# Patient Record
Sex: FEMALE | Race: BLACK OR AFRICAN AMERICAN | Employment: UNEMPLOYED | URBAN - METROPOLITAN AREA
[De-identification: names, ages, dates, MRNs, and addresses within clinical notes are randomized per-mention and may not be internally consistent; named-entity substitution may affect disease eponyms.]

---

## 2020-07-10 ENCOUNTER — HOSPITAL ENCOUNTER (INPATIENT)
Age: 25
LOS: 4 days | Discharge: HOME | DRG: 881 | End: 2020-07-14
Attending: EMERGENCY MEDICINE | Admitting: PSYCHIATRY & NEUROLOGY
Payer: COMMERCIAL

## 2020-07-10 DIAGNOSIS — R45.851 SUICIDAL IDEATION: ICD-10-CM

## 2020-07-10 DIAGNOSIS — F32.A DEPRESSION, UNSPECIFIED DEPRESSION TYPE: Primary | ICD-10-CM

## 2020-07-10 PROBLEM — F32.9 MDD (MAJOR DEPRESSIVE DISORDER): Status: ACTIVE | Noted: 2020-07-10

## 2020-07-10 LAB
ALBUMIN SERPL-MCNC: 3.7 G/DL (ref 3.5–5)
ALBUMIN/GLOB SERPL: 0.9 {RATIO} (ref 1.1–2.2)
ALP SERPL-CCNC: 52 U/L (ref 45–117)
ALT SERPL-CCNC: 24 U/L (ref 12–78)
AMPHET UR QL SCN: NEGATIVE
ANION GAP SERPL CALC-SCNC: 10 MMOL/L (ref 5–15)
APAP SERPL-MCNC: <2 UG/ML (ref 10–30)
APPEARANCE UR: CLEAR
AST SERPL-CCNC: 13 U/L (ref 15–37)
BARBITURATES UR QL SCN: NEGATIVE
BASOPHILS # BLD: 0.1 K/UL (ref 0–0.1)
BASOPHILS NFR BLD: 1 % (ref 0–1)
BENZODIAZ UR QL: NEGATIVE
BILIRUB SERPL-MCNC: 0.3 MG/DL (ref 0.2–1)
BILIRUB UR QL: NEGATIVE
BUN SERPL-MCNC: 12 MG/DL (ref 6–20)
BUN/CREAT SERPL: 13 (ref 12–20)
CALCIUM SERPL-MCNC: 8.7 MG/DL (ref 8.5–10.1)
CANNABINOIDS UR QL SCN: NEGATIVE
CHLORIDE SERPL-SCNC: 108 MMOL/L (ref 97–108)
CO2 SERPL-SCNC: 25 MMOL/L (ref 21–32)
COCAINE UR QL SCN: NEGATIVE
COLOR UR: ABNORMAL
CREAT SERPL-MCNC: 0.89 MG/DL (ref 0.55–1.02)
DIFFERENTIAL METHOD BLD: ABNORMAL
DRUG SCRN COMMENT,DRGCM: NORMAL
EOSINOPHIL # BLD: 0.2 K/UL (ref 0–0.4)
EOSINOPHIL NFR BLD: 2 % (ref 0–7)
ERYTHROCYTE [DISTWIDTH] IN BLOOD BY AUTOMATED COUNT: 14.8 % (ref 11.5–14.5)
ETHANOL SERPL-MCNC: <10 MG/DL
GLOBULIN SER CALC-MCNC: 3.9 G/DL (ref 2–4)
GLUCOSE SERPL-MCNC: 117 MG/DL (ref 65–100)
GLUCOSE UR STRIP.AUTO-MCNC: NEGATIVE MG/DL
HCG UR QL: NEGATIVE
HCT VFR BLD AUTO: 33.1 % (ref 35–47)
HGB BLD-MCNC: 10.1 G/DL (ref 11.5–16)
HGB UR QL STRIP: NEGATIVE
IMM GRANULOCYTES # BLD AUTO: 0 K/UL (ref 0–0.04)
IMM GRANULOCYTES NFR BLD AUTO: 0 % (ref 0–0.5)
KETONES UR QL STRIP.AUTO: NEGATIVE MG/DL
LEUKOCYTE ESTERASE UR QL STRIP.AUTO: NEGATIVE
LYMPHOCYTES # BLD: 2.8 K/UL (ref 0.8–3.5)
LYMPHOCYTES NFR BLD: 37 % (ref 12–49)
MCH RBC QN AUTO: 24.9 PG (ref 26–34)
MCHC RBC AUTO-ENTMCNC: 30.5 G/DL (ref 30–36.5)
MCV RBC AUTO: 81.7 FL (ref 80–99)
METHADONE UR QL: NEGATIVE
MONOCYTES # BLD: 0.7 K/UL (ref 0–1)
MONOCYTES NFR BLD: 10 % (ref 5–13)
NEUTS SEG # BLD: 3.7 K/UL (ref 1.8–8)
NEUTS SEG NFR BLD: 50 % (ref 32–75)
NITRITE UR QL STRIP.AUTO: NEGATIVE
NRBC # BLD: 0 K/UL (ref 0–0.01)
NRBC BLD-RTO: 0 PER 100 WBC
OPIATES UR QL: NEGATIVE
PCP UR QL: NEGATIVE
PH UR STRIP: 6 [PH] (ref 5–8)
PLATELET # BLD AUTO: 285 K/UL (ref 150–400)
PMV BLD AUTO: 11.1 FL (ref 8.9–12.9)
POTASSIUM SERPL-SCNC: 3.6 MMOL/L (ref 3.5–5.1)
PROT SERPL-MCNC: 7.6 G/DL (ref 6.4–8.2)
PROT UR STRIP-MCNC: NEGATIVE MG/DL
RBC # BLD AUTO: 4.05 M/UL (ref 3.8–5.2)
SALICYLATES SERPL-MCNC: <1.7 MG/DL (ref 2.8–20)
SODIUM SERPL-SCNC: 143 MMOL/L (ref 136–145)
SP GR UR REFRACTOMETRY: >1.03 (ref 1–1.03)
UROBILINOGEN UR QL STRIP.AUTO: 0.2 EU/DL (ref 0.2–1)
WBC # BLD AUTO: 7.5 K/UL (ref 3.6–11)

## 2020-07-10 PROCEDURE — 65220000003 HC RM SEMIPRIVATE PSYCH

## 2020-07-10 PROCEDURE — 85025 COMPLETE CBC W/AUTO DIFF WBC: CPT

## 2020-07-10 PROCEDURE — 90791 PSYCH DIAGNOSTIC EVALUATION: CPT

## 2020-07-10 PROCEDURE — 80053 COMPREHEN METABOLIC PANEL: CPT

## 2020-07-10 PROCEDURE — 80307 DRUG TEST PRSMV CHEM ANLYZR: CPT

## 2020-07-10 PROCEDURE — 81003 URINALYSIS AUTO W/O SCOPE: CPT

## 2020-07-10 PROCEDURE — 99285 EMERGENCY DEPT VISIT HI MDM: CPT

## 2020-07-10 PROCEDURE — 36415 COLL VENOUS BLD VENIPUNCTURE: CPT

## 2020-07-10 PROCEDURE — 81025 URINE PREGNANCY TEST: CPT

## 2020-07-10 RX ORDER — VENLAFAXINE HYDROCHLORIDE 37.5 MG/1
37.5 CAPSULE, EXTENDED RELEASE ORAL DAILY
COMMUNITY
End: 2020-07-14

## 2020-07-10 NOTE — ED TRIAGE NOTES
Pt. Reports \"mental health problem\". Pt. States, \"I have been having suicidal ideation\", \"thoughts of harming myself\" for a couple of months. Pt. Reports just moving here, so has not been able to see anyone. Pt. States plan is to \"cut my jugular or slit my wrists\". Pt. Denies attempts. Pt. Denies AVH. Pt. States she \"sometimes\" wants to hurt someone else. \"My boyfriend\". Pt. Quit taking effexor x2 weeks.

## 2020-07-11 PROCEDURE — 65220000003 HC RM SEMIPRIVATE PSYCH

## 2020-07-11 PROCEDURE — 74011250637 HC RX REV CODE- 250/637: Performed by: NURSE PRACTITIONER

## 2020-07-11 RX ORDER — DIPHENHYDRAMINE HYDROCHLORIDE 50 MG/ML
50 INJECTION, SOLUTION INTRAMUSCULAR; INTRAVENOUS
Status: DISCONTINUED | OUTPATIENT
Start: 2020-07-11 | End: 2020-07-14 | Stop reason: HOSPADM

## 2020-07-11 RX ORDER — BENZTROPINE MESYLATE 1 MG/1
1 TABLET ORAL
Status: DISCONTINUED | OUTPATIENT
Start: 2020-07-11 | End: 2020-07-14 | Stop reason: HOSPADM

## 2020-07-11 RX ORDER — ACETAMINOPHEN 325 MG/1
650 TABLET ORAL
Status: DISCONTINUED | OUTPATIENT
Start: 2020-07-11 | End: 2020-07-14 | Stop reason: HOSPADM

## 2020-07-11 RX ORDER — TRAZODONE HYDROCHLORIDE 50 MG/1
50 TABLET ORAL
Status: DISCONTINUED | OUTPATIENT
Start: 2020-07-11 | End: 2020-07-14 | Stop reason: HOSPADM

## 2020-07-11 RX ORDER — OLANZAPINE 5 MG/1
5 TABLET ORAL
Status: DISCONTINUED | OUTPATIENT
Start: 2020-07-11 | End: 2020-07-14 | Stop reason: HOSPADM

## 2020-07-11 RX ORDER — SERTRALINE HYDROCHLORIDE 50 MG/1
25 TABLET, FILM COATED ORAL DAILY
Status: DISCONTINUED | OUTPATIENT
Start: 2020-07-11 | End: 2020-07-13

## 2020-07-11 RX ORDER — HALOPERIDOL 5 MG/ML
5 INJECTION INTRAMUSCULAR
Status: DISCONTINUED | OUTPATIENT
Start: 2020-07-11 | End: 2020-07-14 | Stop reason: HOSPADM

## 2020-07-11 RX ORDER — HYDROXYZINE 25 MG/1
50 TABLET, FILM COATED ORAL
Status: DISCONTINUED | OUTPATIENT
Start: 2020-07-11 | End: 2020-07-14 | Stop reason: HOSPADM

## 2020-07-11 RX ORDER — LORAZEPAM 2 MG/ML
1 INJECTION INTRAMUSCULAR
Status: DISCONTINUED | OUTPATIENT
Start: 2020-07-11 | End: 2020-07-14 | Stop reason: HOSPADM

## 2020-07-11 RX ORDER — ADHESIVE BANDAGE
30 BANDAGE TOPICAL DAILY PRN
Status: DISCONTINUED | OUTPATIENT
Start: 2020-07-11 | End: 2020-07-14 | Stop reason: HOSPADM

## 2020-07-11 RX ADMIN — SERTRALINE HYDROCHLORIDE 25 MG: 50 TABLET ORAL at 12:50

## 2020-07-11 NOTE — BH NOTES
Admission Note:  Patient is a 31year old  female admitted to the unit voluntarily. Patient arrived to the unit in wheelchair escorted by security. Patient was cooperative with the admission process and body search. No contraband was found during the body search. Patient signed a copy of the unit rules and expectations. Background:  Patient was brought into the emergency department by their boyfriend after having suicidal thoughts with a plan. Patient also expressed homicidal thoughts towards their boyfriend due to recent relationship struggles. Patient stated \"Ever since I moved to [place] 7 months ago I have been feeling so lonely and with coronavirus I have not been able to see my friends. \" Patient has not taken their effexor in over two weeks and stated \"I was bad before I even stopped taking my medication. \"    Mental Health history:  Patient reported seeing a psychiatrist through outpatient and being prescribed Effexor 37.5mg PO daily by Jerel Delatorre. Patient reports no previous inpatient admissions. Medical History:  Patient denies. Social:  Patient finished their masters program at [address] [place] [address] and recently moved to [place] 7 months ago for a new job. Patient has no support system in [place] and a long distance boyfriend. UDS:   Negative  Alcohol:  Negative.

## 2020-07-11 NOTE — BH NOTES
Hourly rounds completed. There are no noted behavioral issues to note. Resident has confirmed that yesterday she felt anxious and depressed. She does not feel that way today per her report. She has been both medication and meal compliant. Will continue to monitor for changes.

## 2020-07-11 NOTE — BSMART NOTE
Comprehensive Assessment Form Part 1 Section I - Disposition Axis I - Major Depressive Disorder Axis II - None Axis III - None Axis IV - Relationship stressors [place] V - 35 The Medical Doctor to Psychiatrist conference was not completed. The Medical Doctor is in agreement with Psychiatrist disposition because of (reason) patient is willing to be admitted voluntarily. The plan is admit to [facility]. The on-call Psychiatrist consulted was GONZALO Foster. The admitting Psychiatrist will be Dr. Emely Day. The admitting Diagnosis is Major Depression. The Payor source is [place]. Section II - Integrated Summary Summary:  Patient is a 31year old female seen face to face in the ER. She reported she has been experiencing suicidal ideation for 2 months that has been much worse recently. She reported her plan to kill herself would be to slit her wrists or throat, or to stab herself repeatedly. She also reported some homicidal thoughts towards her long distance boyfriend that she doesn't think are rational.  He is aware of the thoughts and she has no plan to harm him. She denied symptoms of psychosis. She moved to the [facility] in January and lives alone. She was seeing a counselor before she moved. She has taken Effexor in the past but didn't find it to be helpful. She hasn't taken it in 2 weeks as the prescription ran out. She has not received any mental health treatment since moving. She has never been psychiatrically hospitalized. She has never attempted suicide. She is not sleeping and her appetite at times is not good. She denied any substance abuse. She is requesting voluntary admission. The patient has demonstrated mental capacity to provide informed consent. The information is given by the patient. The Chief Complaint is mental health problem. The Precipitant Factors are relationship stressors.  
Previous Hospitalizations: no 
 The patient has not previously been in restraints. Current Psychiatrist and/or  is NA. Lethality Assessment: 
 
The potential for suicide is noted by the following: defined plan and ideation. The potential for homicide is noted by the following : ideation. The patient has not been a perpetrator of sexual or physical abuse. There are not pending charges. The patient is felt to be at risk for self harm or harm to others. The attending nurse was advised the patient needs supervision. Section III - Psychosocial 
The patient's overall mood and attitude is withdrawn. Feelings of helplessness and hopelessness are not observed. Generalized anxiety is not observed. Panic is not observed. Phobias are not observed. Obsessive compulsive tendencies are not observed. Section IV - Mental Status Exam 
The patient's appearance shows no evidence of impairment. The patient's behavior shows no evidence of impairment. The patient is oriented to time, place, person and situation. The patient's speech shows no evidence of impairment. The patient's mood is withdrawn. The range of affect is constricted. The patient's thought content demonstrates no evidence of impairment. The thought process shows no evidence of impairment. The patient's perception shows no evidence of impairment. The patient's memory shows no evidence of impairment. The patient's appetite shows no evidence of impairment. The patient's sleep has evidence of insomnia. The patient's insight shows no evidence of impairment. The patient's judgement is psychologically impaired. Section V - Substance Abuse The patient is not using substances. Section VI - Living Arrangements The patient has a significant other. This person's approximate age is 27 and appears to be in good health. The patient lives alone. The patient has no children. The patient does plan to return home upon discharge. The patient does not have legal issues pending. The patient's source of income comes from employment. Tenriism and cultural practices have not been voiced at this time. The patient's greatest support comes from her boyfriend and this person will not be involved with the treatment. The patient has not been in an event described as horrible or outside the realm of ordinary life experience either currently or in the past. 
The patient has not been a victim of sexual/physical abuse. Section VII - Other Areas of Clinical Concern The highest grade achieved is not assessed with the overall quality of school experience being described as unknown. The patient is currently employed and speaks [place] as a primary language. The patient has no communication impairments affecting communication. The patient's preference for learning can be described as: can read and write adequately.   The patient's hearing is normal.  The patient's vision is normal. 
 
 
Di Dixon MA

## 2020-07-11 NOTE — ED NOTES
TRANSFER - OUT REPORT:    Verbal report given to Yue Shook RN (name) on Nya Julien  being transferred to [facility] (unit) for routine progression of care       Report consisted of patients Situation, Background, Assessment and   Recommendations(SBAR). Information from the following report(s) SBAR, ED Summary and Recent Results was reviewed with the receiving nurse. Lines:       Opportunity for questions and clarification was provided.       Patient transported with:  [facility]

## 2020-07-11 NOTE — ED NOTES
Pt in ED w/ complaint of depression, SI, and HI. Pt denies auditory & visual hallucinations and alcohol & drug use. Pt reports that she has several plans for SI but does not want to act on any of them. Pt reports HI to specific people. Pt reports that she takes Effexor but she has not had any in the past 2 weeks. Pt is A&O X 4 and appears to be in no distress. Emergency Department Nursing Plan of Care       The Nursing Plan of Care is developed from the Nursing assessment and Emergency Department Attending provider initial evaluation. The plan of care may be reviewed in the ED Provider note.     The Plan of Care was developed with the following considerations:   Patient / Family readiness to learn indicated by:verbalized understanding  Persons(s) to be included in education: patient  Barriers to Learning/Limitations:No    Signed     Kathleen Lema RN    7/10/2020   8:54 PM

## 2020-07-11 NOTE — ED PROVIDER NOTES
EMERGENCY DEPARTMENT HISTORY AND PHYSICAL EXAM      Date: 7/10/2020  Patient Name: Roe Armendariz    History of Presenting Illness     Chief Complaint   Patient presents with   [address] Problem       History Provided By: Patient    HPI: Roe Armendariz, 31 y.o. female with PMHx as noted below presents the emergency department with complaints of suicidal ideation. Patient notes that she has been having worsening depression now for some time and is began to have suicidal thoughts without any specific plan. Patient notes that she was under the care of a psychiatrist in [place] and had been prescribed Effexor however since moving here she has not sought any follow-up and is now run of this medication. She is denying any other medical complaints at this time and is only here hoping to receive psychiatric help. She denies any intent at self-harm today and did not overdose on any medications. Denies any drug or alcohol use. PCP: Hui Kwan MD    Current Outpatient Medications   Medication Sig Dispense Refill    venlafaxine-SR (Effexor XR) 150 mg capsule Take 37.5 mg by mouth daily. Past History     Past Medical History:  Past Medical History:   Diagnosis Date    Depression        Past Surgical History:  Past Surgical History:   Procedure Laterality Date    HX ORTHOPAEDIC      Back       Family History:  History reviewed. No pertinent family history. Social History:  Social History     Tobacco Use    Smoking status: Never Smoker    Smokeless tobacco: Never Used   Substance Use Topics    Alcohol use: Yes     Frequency: Never     Comment: Ocassionally    Drug use: Never       Allergies: Allergies   Allergen Reactions    [place] Nausea Only         Review of Systems   Review of Systems  Constitutional: Negative for fever, chills, and fatigue. HENT: Negative for congestion, sore throat, rhinorrhea, sneezing and neck stiffness   Eyes: Negative for discharge and redness.    Respiratory: Negative for  shortness of breath, wheezing   Cardiovascular: Negative for chest pain, palpitations   Gastrointestinal: Negative for nausea, vomiting, abdominal pain, constipation, diarrhea and blood in stool. Genitourinary: Negative for dysuria, hematuria, flank pain, decreased urine volume, discharge,   Musculoskeletal: Negative for myalgias or joint pain . Skin: Negative for rash or lesions . Neurological: Negative weakness, light-headedness, numbness and headaches. Physical Exam   Physical Exam    GENERAL: alert and oriented, no acute distress  EYES: PEERL, No injection, discharge or icterus. ENT: Mucous membranes pink and moist.  NECK: Supple  LUNGS: Airway patent. Non-labored respirations. Breath sounds clear with good air entry bilaterally. HEART: Regular rate and rhythm. No peripheral edema  ABDOMEN: Non-distended and non-tender, without guarding or rebound. SKIN:  warm, dry  MSK/EXTREMITIES: Without swelling, tenderness or deformity, symmetric with normal ROM  NEUROLOGICAL: Alert, oriented      Diagnostic Study Results     Labs -     Recent Results (from the past 12 hour(s))   CBC WITH AUTOMATED DIFF    Collection Time: 07/10/20  8:14 PM   Result Value Ref Range    WBC 7.5 3.6 - 11.0 K/uL    RBC 4.05 3.80 - 5.20 M/uL    HGB 10.1 (L) 11.5 - 16.0 g/dL    HCT 33.1 (L) 35.0 - 47.0 %    MCV 81.7 80.0 - 99.0 FL    MCH 24.9 (L) 26.0 - 34.0 PG    MCHC 30.5 30.0 - 36.5 g/dL    RDW 14.8 (H) 11.5 - 14.5 %    PLATELET 867 845 - 940 K/uL    MPV 11.1 8.9 - 12.9 FL    NRBC 0.0 0  WBC    ABSOLUTE NRBC 0.00 0.00 - 0.01 K/uL    NEUTROPHILS 50 32 - 75 %    LYMPHOCYTES 37 12 - 49 %    MONOCYTES 10 5 - 13 %    EOSINOPHILS 2 0 - 7 %    BASOPHILS 1 0 - 1 %    IMMATURE GRANULOCYTES 0 0.0 - 0.5 %    ABS. NEUTROPHILS 3.7 1.8 - 8.0 K/UL    ABS. LYMPHOCYTES 2.8 0.8 - 3.5 K/UL    ABS. MONOCYTES 0.7 0.0 - 1.0 K/UL    ABS. EOSINOPHILS 0.2 0.0 - 0.4 K/UL    ABS. BASOPHILS 0.1 0.0 - 0.1 K/UL    ABS. IMM.  GRANS. 0.0 0.00 - 0.04 K/UL    DF AUTOMATED     METABOLIC PANEL, COMPREHENSIVE    Collection Time: 07/10/20  8:14 PM   Result Value Ref Range    Sodium 143 136 - 145 mmol/L    Potassium 3.6 3.5 - 5.1 mmol/L    Chloride 108 97 - 108 mmol/L    CO2 25 21 - 32 mmol/L    Anion gap 10 5 - 15 mmol/L    Glucose 117 (H) 65 - 100 mg/dL    BUN 12 6 - 20 MG/DL    Creatinine 0.89 0.55 - 1.02 MG/DL    BUN/Creatinine ratio 13 12 - 20      GFR est AA >60 >60 ml/min/1.73m2    GFR est non-AA >60 >60 ml/min/1.73m2    Calcium 8.7 8.5 - 10.1 MG/DL    Bilirubin, total 0.3 0.2 - 1.0 MG/DL    ALT (SGPT) 24 12 - 78 U/L    AST (SGOT) 13 (L) 15 - 37 U/L    Alk.  phosphatase 52 45 - 117 U/L    Protein, total 7.6 6.4 - 8.2 g/dL    Albumin 3.7 3.5 - 5.0 g/dL    Globulin 3.9 2.0 - 4.0 g/dL    A-G Ratio 0.9 (L) 1.1 - 2.2     SALICYLATE    Collection Time: 07/10/20  8:14 PM   Result Value Ref Range    Salicylate level <2.8 (L) 2.8 - 20.0 MG/DL   ACETAMINOPHEN    Collection Time: 07/10/20  8:14 PM   Result Value Ref Range    Acetaminophen level <2 (L) 10 - 30 ug/mL   URINALYSIS W/ RFLX MICROSCOPIC    Collection Time: 07/10/20  8:14 PM   Result Value Ref Range    Color YELLOW/STRAW      Appearance CLEAR CLEAR      Specific gravity >1.030 (H) 1.003 - 1.030    pH (UA) 6.0 5.0 - 8.0      Protein Negative NEG mg/dL    Glucose Negative NEG mg/dL    Ketone Negative NEG mg/dL    Bilirubin Negative NEG      Blood Negative NEG      Urobilinogen 0.2 0.2 - 1.0 EU/dL    Nitrites Negative NEG      Leukocyte Esterase Negative NEG     ETHYL ALCOHOL    Collection Time: 07/10/20  8:14 PM   Result Value Ref Range    ALCOHOL(ETHYL),SERUM <10 <10 MG/DL   DRUG SCREEN, URINE    Collection Time: 07/10/20  8:14 PM   Result Value Ref Range    AMPHETAMINES Negative NEG      BARBITURATES Negative NEG      BENZODIAZEPINES Negative NEG      COCAINE Negative NEG      METHADONE Negative NEG      OPIATES Negative NEG      PCP(PHENCYCLIDINE) Negative NEG      THC (TH-CANNABINOL) Negative NEG Drug screen comment (NOTE)    HCG URINE, QL. - POC    Collection Time: 07/10/20  8:14 PM   Result Value Ref Range    Pregnancy test,urine (POC) Negative NEG         Radiologic Studies -   No orders to display     CT Results  (Last 48 hours)    None        CXR Results  (Last 48 hours)    None            Medical Decision Making   Jan WALTON MD am the first provider for this patient and am the attending of record for this patient encounter. I reviewed the vital signs, available nursing notes, past medical history, past surgical history, family history and social history. Vital Signs-Reviewed the patient's vital signs. Patient Vitals for the past 12 hrs:   Temp Pulse Resp BP SpO2   07/10/20 2235 98.1 °F (36.7 °C) 64 14 149/86 99 %   07/10/20 1930 98.1 °F (36.7 °C) 84 18 168/90 100 %       Records Reviewed: Nursing Notes and Old Medical Records    Provider Notes (Medical Decision Making): On presentation the patient is well appearing, in no acute distress with normal vital signs. The patient presents to the Emergency Department for psychiatric evaluation. She is complaining of worsening depression symptoms with  suicidal thoughts. She specifically denies any intentional ingestions/overdoses. She also denies any acute medical complaints at this time and is only seeking psychiatric evaluation. She is willing to be admitted voluntarily. After a thorough medical evaluation and basic labs including a CBC, CMP, UDS, etoh, and UA no emergent life threatening medical conditions identified and she is medically clear for further psychiatric evaluation. ED Course:   Initial assessment performed. The patients presenting problems have been discussed, and they are in agreement with the care plan formulated and outlined with them. I have encouraged them to ask questions as they arise throughout their visit. Admit Note  Patient is being admitted to the hospital by Dr. Becka Mclaughlin.   The results of their tests and reasons for their admission have been discussed with them and/or available family. They convey agreement and understanding for the need to be admitted and for their admission diagnosis. Consultation has been made with the inpatient physician specialist for hospitalization. *. Disposition:  admit    PLAN:  1. admit    Diagnosis     Clinical Impression:   1. Depression, unspecified depression type    2. Suicidal ideation        Please note that this dictation was completed with Dragon, computer voice recognition software. Quite often unanticipated grammatical, syntax, homophones, and other interpretive errors are inadvertently transcribed by the computer software. Please disregard these errors. Additionally, please excuse any errors that have escaped final proofreading.

## 2020-07-11 NOTE — BSMART NOTE
MUKESH called [employer] at [phone] to get pre-authorization for the patient. [employer] reported that they cannot give pre-authorization for this patient due to the type of insurance plan. [employer] directed Jocelyne to Sergo cruz at [phone]. [facility] was closed and [facility] could not contact a representative.

## 2020-07-11 NOTE — GROUP NOTE
[facility] GROUP DOCUMENTATION INDIVIDUAL Group Therapy Note Date: 7/11/2020 Group Start Time: 7061 Group End Time: 9883 Group Topic: Social Work Group [facility] Tata Trammell [facility] GROUP DOCUMENTATION GROUP Group Therapy Note Attendees: 7 Attendance: Attended Patient's Goal:   Patient will be able to identify areas that they are grateful for, their positive qualities, and their future goals. Patient will be able to participate in group discussion and give feedback to others about their positive qualities. Patient will be able to identify and process their needs and opportunities for growth. Interventions/techniques: Challenged, Informed, Validated, Promoted peer support and Supported Follows Directions: Followed directions Interactions: Interacted appropriately Mental Status: Calm and Congruent Behavior/appearance: Attentive and Cooperative Goals Achieved: Able to engage in interactions, Able to listen to others, Able to reflect/comment on own behavior, Able to manage/cope with feelings, Able to self-disclose, Discussed coping and Identified feelings Additional Notes:  Patient was actively involved in group discussion, and stated that she is proud of her education and her family. Patient states that she needs to learn how to lean on her support system more, and make sure that she is able to communicate her needs to others. Ronn Rodriguez

## 2020-07-11 NOTE — BH NOTES
Since arriving to the unit patient has been calm and cooperative. Patient has been resting in their room during rounds. Patient denies any S/I, H/I, and AVH at this time. Patient slept a total of 6 hours this shift.

## 2020-07-11 NOTE — PROGRESS NOTES
Problem: Pain:  Goal: Pain level will decrease  Description: Pain level will decrease  Outcome: Met This Shift  Goal: Control of acute pain  Description: Control of acute pain  Outcome: Met This Shift     Problem:  Bowel/Gastric:  Goal: Control of bowel function will improve  Description: Control of bowel function will improve  Outcome: Met This Shift  Goal: Ability to achieve a regular elimination pattern will improve  Description: Ability to achieve a regular elimination pattern will improve  Outcome: Met This Shift     Problem: Nutritional:  Goal: Ability to follow a diet with enough fiber (20 to 30 grams) for normal bowel function will improve  Description: Ability to follow a diet with enough fiber (20 to 30 grams) for normal bowel function will improve  Outcome: Met This Shift     Problem: Falls - Risk of:  Goal: Will remain free from falls  Description: Will remain free from falls  Outcome: Met This Shift  Goal: Absence of physical injury  Description: Absence of physical injury  Outcome: Met This Shift Problem: Suicide  Goal: *STG: Remains safe in hospital  Outcome: Progressing Towards Goal

## 2020-07-11 NOTE — PROGRESS NOTES
[facility] Admission Pharmacy Medication Reconciliation     Information obtained from: provider/counselor/nurse notes  RxQuery data available1: none    Comments/recommendations:    1) Patient recently moved to [place]. Since moving here she has not sought any follow-up and is now run of this medication, Effexor, \"2 weeks ago. \"    2) Medication changes (since last review): Added  none  Removed  none  Adjusted  none     1RxQuery pharmacy benefit data reflects medications filled and processed through the patient's insurance, however                this data does NOT capture whether the medication was picked up or is currently being taken by the patient. Past Medical History/Disease States:  Past Medical History:   Diagnosis Date    Depression          Patient allergies: Allergies as of 07/10/2020 - Review Complete 07/10/2020   Allergen Reaction Noted    [place] Nausea Only 07/10/2020         Prior to Admission Medications   Prescriptions Last Dose Informant Patient Reported? Taking?   venlafaxine-SR (Effexor XR) 37.5 mg capsule  2 weeks ago  Yes No, ran out   Sig: Take 37.5 mg by mouth daily.            Thank you,  Edwin Cunningham, [facility]

## 2020-07-12 PROCEDURE — 74011250637 HC RX REV CODE- 250/637: Performed by: NURSE PRACTITIONER

## 2020-07-12 PROCEDURE — 65220000003 HC RM SEMIPRIVATE PSYCH

## 2020-07-12 RX ORDER — ONDANSETRON 4 MG/1
4 TABLET, ORALLY DISINTEGRATING ORAL
Status: DISCONTINUED | OUTPATIENT
Start: 2020-07-12 | End: 2020-07-14 | Stop reason: HOSPADM

## 2020-07-12 RX ADMIN — SERTRALINE HYDROCHLORIDE 25 MG: 50 TABLET ORAL at 08:53

## 2020-07-12 RX ADMIN — ONDANSETRON 4 MG: 4 TABLET, ORALLY DISINTEGRATING ORAL at 10:49

## 2020-07-12 RX ADMIN — ACETAMINOPHEN 650 MG: 325 TABLET, FILM COATED ORAL at 10:49

## 2020-07-12 NOTE — H&P
Psychiatry History and Physical    Subjective:     Date of Evaluation:  7/11/2020    History of Presenting Problem: Roe Armendariz is a 31year old female admitted to the [facility] for suicidal ideation. Her mood has been \"easily irritated. \" She has no plan for suicide. She has been feeling worse for the past couple of months. She moved to [facility] from [place] in January and has not gotten a good support system here since moving. She has difficulties falling asleep. She is only motivated to get things completed for her job but nothing else. She had been taking Effexor for about 9 months but has been off for at least a few weeks, she also does not feel that it was effective. Patient Active Problem List    Diagnosis Date Noted    MDD (major depressive disorder) 07/10/2020     Past Medical History:   Diagnosis Date    Depression      Past Surgical History:   Procedure Laterality Date    HX ORTHOPAEDIC      Back       History reviewed. No pertinent family history. Social History     Tobacco Use    Smoking status: Never Smoker    Smokeless tobacco: Never Used   Substance Use Topics    Alcohol use: Yes     Frequency: Never     Comment: Ocassionally     Prior to Admission medications    Medication Sig Start Date End Date Taking? Authorizing Provider   venlafaxine-SR (Effexor XR) 37.5 mg capsule Take 37.5 mg by mouth daily. Other, MD Reg     Allergies   Allergen Reactions    [place] Nausea Only          Objective:     No data found.     Mental Status exam: WNL except for    Sensorium  oriented to time, place and person   Orientation person, place, time/date and situation   Relations cooperative   Eye Contact appropriate   Appearance:  age appropriate   Motor Behavior:  within normal limits   Speech:  normal pitch and normal volume   Vocabulary average   Thought Process: logical   Thought Content not internally preoccupied    Suicidal ideations none   Homicidal ideations none   Mood:  depressed   Affect:  full range   Memory recent  adequate   Memory remote:  adequate   Concentration:  adequate   Abstraction:  abstract   Insight:  good   Reliability good   Judgment:  good         Impression:      Active Problems:    MDD (major depressive disorder) (7/10/2020)          Plan:     Admit to [facility]  Medication management  Gather further information  Disposition planning with social work  Estimated length of stay 3-5 days

## 2020-07-12 NOTE — PROGRESS NOTES
Problem: Suicide  Goal: *STG: Remains safe in hospital  Outcome: Progressing Towards Goal     Problem: Suicide  Goal: *STG: Seeks staff when feelings of self harm or harm towards others arise  Outcome: Progressing Towards Goal     Problem: Suicide  Goal: *STG: Attends activities and groups  Outcome: Progressing Towards Goal     Problem: Suicide  Goal: *STG:  Verbalizes alternative ways of dealing with maladaptive feelings/behaviors  Outcome: Progressing Towards Goal     Problem: Suicide  Goal: *STG/LTG: No longer expresses self destructive or suicidal thoughts  Outcome: Progressing Towards Goal    1900- Report given by Susy Ruelas and I assume care of the patient. Patient is pacing the hallway. Patient is calm and cooperative. Patient denies SI/HI/AH/VH. Patient endorses anxiety and rates it  4/10. Patient endorses depression and rates it a 6/10. Patient is now writing and coloring in her room. 2030-Patient is sitting in the dayroom eating and watching TV.    2210- Patient is writing in her room. 0030- Patient is now resting in her bed.    [id number]- Patient is now resting in her bed.    2047- Patient slept approx. 8 hrs.

## 2020-07-12 NOTE — BH NOTES
0700  Received report from MECCA RN. Assumed care of the patient. Pt in day room watching television upon assessment. Pt endorses mild depression 3/10 and states she is nauseous this am.  Pt states she is having back pain 5/10 and declines offer of prn tylenol at this time. Pt denies anxiety/SI/HI/AVH.   Last bm was this am.    0800  Pt in day room for breakfast    0853  Pt compliant with am medication    1049  Pt given prn tylenol for c/o back pain 8/10 and zofran per request    1130  Tylenol effective; pt resting quietly in her room at this time    1215  Pt in day room for lunch    1700  Pt in day room for dinner

## 2020-07-12 NOTE — GROUP NOTE
[facility] GROUP DOCUMENTATION INDIVIDUAL Group Therapy Note Date: 7/11/2020 Group Start Time: 4373 Group End Time: 1452 Group Topic: Social Work Group [facility] [place] [facility] GROUP DOCUMENTATION GROUP Group Therapy Note Attendees: 7 Attendance: Attended Patient's Goal:   Patient will be able to identify maladaptive patterns in thoughts, behaviors, and coping skills when they are experiencing a decline. Patient will learn and process ways to better recognize when they may be heading for more severe problems and how to ask for help. Patient will be able to process how their own mental illness may be presenting itself, and ways to better manage their care. Interventions/techniques: Challenged, Informed, Validated and Promoted peer support Follows Directions: Followed directions Interactions: Interacted appropriately Mental Status: Calm and Congruent Behavior/appearance: Attentive and Cooperative Goals Achieved: Able to engage in interactions, Able to listen to others and Able to reflect/comment on own behavior Additional Notes:  Patient was quiet during discussion, but was actively listening and nodding with peers. Patient wrote several paragraphs on the activity sheet, but asked if she could keep it private. Patient verbalized understanding on what Trauma is, and how it affects the body, and was able to identify ways that her depression can manifest itself. Anna Aly

## 2020-07-12 NOTE — BH NOTES
PSYCHOSOCIAL ASSESSMENT  :Patient identifying info:  Abdias Hansen is a 31 y.o., female admitted 7/10/2020  7:32 PM     Presenting problem and precipitating factors: Patient presented to the ER voluntarily for suicidal ideation with a plan (by slitting her wrists or stabbing herself repeatedly) and means to carry out that plan. Patient also disclosed that she had been researching various veins/arteries and their specific depths so that she could \"do it as quick and as accurately as possible. \" Patient was taking Effexor, but states that she didn't feel as if it was working well, so when she ran out of the prescription, she did not seek a refill and instead focused on ending her life. Mental status assessment: Patient presents calm and rational, and states that she understands what she is feeling is not rational, and knew that she needed help. She is cooperative with all staff members, but is worried about losing her job if she is here too long. Strengths: Seeks help, employed, well-educated, independent, and has a good support system. Collateral information: Patient asks that no one be called by the hospital    Current psychiatric /substance abuse providers and contact info: To Be Linked    Previous psychiatric/substance abuse providers and response to treatment: None. Effexor was prescribed by her PCP, and she states that it never worked as it was supposed to.      Family history of mental illness or substance abuse: None    Substance abuse history:  None  Social History     Tobacco Use    Smoking status: Never Smoker    Smokeless tobacco: Never Used   Substance Use Topics    Alcohol use: Yes     Frequency: Never     Comment: Ocassionally       History of biomedical complications associated with substance abuse: None    Patient's current acceptance of treatment or motivation for change: Patient is amenable to treatment, and is open to trying a different medication and wants to find a therapist. Family constellation: Patient has several siblings and both of her parents live in [place]. She is not , but has a long distance relationship in [place]. She has no children. Is significant other involved? No.       Describe support system: Patient had a significant support system in [place], but has recently relocated to [place] for a job, and has few friends in the area. Patient states that she has been active in her [place] and has \"second parents\" there, but has struggled with finding friends after college. Describe living arrangements and home environment: She has her own apartment in [place]. She lives alone. Health issues:   Hospital Problems  Never Reviewed          Codes Class Noted POA    MDD (major depressive disorder) ICD-10-CM: F32.9  ICD-9-CM: 296.20  7/10/2020 Unknown              Trauma history: Patient was a high-ranked  and has sustained several injuries due to maladaptive coaching and training. She is still recovering emotionally from \"Losing the one thing I wasn't a failure at. \"     Legal issues: None    History of  service: None    Financial status: Employed full time. Alevism/cultural factors: Sabianism. 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 Have you been licensed as a health care professional (current or ): No    Leisure and recreation preferences: \"I don't have much anymore. \" Patient was an avid athlete in college, but states that she has not done a lot for self care lately due to the pandemic. She enjoys spending time with friends when she can. Describe coping skills: Limited. She states, \"Yeah, I need to work on those, too. \"     Leanne [place]  2020

## 2020-07-13 PROCEDURE — 74011250637 HC RX REV CODE- 250/637: Performed by: PSYCHIATRY & NEUROLOGY

## 2020-07-13 PROCEDURE — 65220000003 HC RM SEMIPRIVATE PSYCH

## 2020-07-13 PROCEDURE — 74011250637 HC RX REV CODE- 250/637: Performed by: NURSE PRACTITIONER

## 2020-07-13 RX ORDER — SERTRALINE HYDROCHLORIDE 50 MG/1
50 TABLET, FILM COATED ORAL DAILY
Status: DISCONTINUED | OUTPATIENT
Start: 2020-07-14 | End: 2020-07-14 | Stop reason: HOSPADM

## 2020-07-13 RX ORDER — SERTRALINE HYDROCHLORIDE 50 MG/1
25 TABLET, FILM COATED ORAL
Status: COMPLETED | OUTPATIENT
Start: 2020-07-13 | End: 2020-07-13

## 2020-07-13 RX ADMIN — SERTRALINE HYDROCHLORIDE 25 MG: 50 TABLET ORAL at 12:25

## 2020-07-13 RX ADMIN — SERTRALINE HYDROCHLORIDE 25 MG: 50 TABLET ORAL at 08:18

## 2020-07-13 NOTE — PROGRESS NOTES
Problem: Suicide  Goal: *STG: Remains safe in hospital  Outcome: Progressing Towards Goal  Goal: *STG: Attends activities and groups  Outcome: Progressing Towards Goal  Goal: *STG/LTG: No longer expresses self destructive or suicidal thoughts  Outcome: Progressing Towards Goal

## 2020-07-13 NOTE — BH NOTES
0700  Received report from MECCA RN. Assumed care of the patient. Pt sitting in day room upon assessment. Pt endorses depression and some anxiety but denies SI/HI/AVH. Pt has chronic back pain from a previous back surgery. Pain currently rated as 7/10 but pt does not want any prn pain medication at this time. Pt states it hurts because it is currently raining outside. Last bm was this morning.     0800  Pt in day room for breakfast    0818  Pt compliant with am medication    1030  Pt in day room for group therapy    1200  Pt in day room for lunch    1225  Pt's zoloft increased; pt compliant with now dose

## 2020-07-13 NOTE — BH NOTES
Chief Complaint:  \"I'm ok\"    Length of Stay: 2 Days    Interval History:  Nitish is seen in the dayroom this morning. She is taking her medications. She is experiencing some nausea. She denies SI/HI/AH/VH. She is eating and sleeping adequately      Past Medical History:  Past Medical History:   Diagnosis Date    Depression            Labs:  Lab Results   Component Value Date/Time    WBC 7.5 07/10/2020 08:14 PM    HGB 10.1 (L) 07/10/2020 08:14 PM    HCT 33.1 (L) 07/10/2020 08:14 PM    PLATELET 654 32/40/9062 08:14 PM    MCV 81.7 07/10/2020 08:14 PM      Lab Results   Component Value Date/Time    Sodium 143 07/10/2020 08:14 PM    Potassium 3.6 07/10/2020 08:14 PM    Chloride 108 07/10/2020 08:14 PM    CO2 25 07/10/2020 08:14 PM    Anion gap 10 07/10/2020 08:14 PM    Glucose 117 (H) 07/10/2020 08:14 PM    BUN 12 07/10/2020 08:14 PM    Creatinine 0.89 07/10/2020 08:14 PM    BUN/Creatinine ratio 13 07/10/2020 08:14 PM    GFR est AA >60 07/10/2020 08:14 PM    GFR est non-AA >60 07/10/2020 08:14 PM    Calcium 8.7 07/10/2020 08:14 PM    Bilirubin, total 0.3 07/10/2020 08:14 PM    Alk.  phosphatase 52 07/10/2020 08:14 PM    Protein, total 7.6 07/10/2020 08:14 PM    Albumin 3.7 07/10/2020 08:14 PM    Globulin 3.9 07/10/2020 08:14 PM    A-G Ratio 0.9 (L) 07/10/2020 08:14 PM    ALT (SGPT) 24 07/10/2020 08:14 PM      Vitals:    07/11/20 0029 07/11/20 0810 07/11/20 2005 07/12/20 0730   BP: 140/90 145/79 150/90 (!) 136/93   Pulse: 62 69 64 74   Resp: 20 18 18 16   Temp: 98 °F (36.7 °C) 98.1 °F (36.7 °C) 98.2 °F (36.8 °C) 97.8 °F (36.6 °C)   SpO2: 100% 100% 100% 100%   Weight:       Height:       LMP: 06/03/2020         Current Facility-Administered Medications   Medication Dose Route Frequency Provider Last Rate Last Dose    ondansetron (ZOFRAN ODT) tablet 4 mg  4 mg Oral Q6H PRN Rosalina Maynard NP   4 mg at 07/12/20 1049    OLANZapine (ZyPREXA) tablet 5 mg  5 mg Oral Q6H PRN Jennie Thomas NP        haloperidol lactate (HALDOL) injection 5 mg  5 mg IntraMUSCular Q6H PRN Lilia Moctezuma, GONZALO        benztropine (COGENTIN) tablet 1 mg  1 mg Oral BID PRN Lilia Moctezuma NP        diphenhydrAMINE (BENADRYL) injection 50 mg  50 mg IntraMUSCular BID PRN Lilia Moctezuma NP        hydrOXYzine HCL (ATARAX) tablet 50 mg  50 mg Oral TID PRN Ivory Hassan NP        LORazepam (ATIVAN) injection 1 mg  1 mg IntraMUSCular Q4H PRN Lilia Moctezuma, GONZALO        traZODone (DESYREL) tablet 50 mg  50 mg Oral QHS PRN Lilia Moctezuma NP        acetaminophen (TYLENOL) tablet 650 mg  650 mg Oral Q4H PRN Ivory Hassan NP   650 mg at 07/12/20 1049    magnesium hydroxide (MILK OF MAGNESIA) 400 mg/5 mL oral suspension 30 mL  30 mL Oral DAILY PRN Lilia Moctezuma NP        sertraline (ZOLOFT) tablet 25 mg  25 mg Oral DAILY Rosalina Maynard NP   25 mg at 07/12/20 0853         Mental Status Exam:  Eye contact:   Grooming: fair  Psychomotor activity:   Speech is spontaneous  Mood is \" \"  Affect:  Perception:  Suicidal ideation:   Cognition is grossly intact         Physical Exam:  Body habitus: Body mass index is 46.79 kg/m². Musculoskeletal system: normal gait  Tremor - neg  Cog wheeling - neg      Assessment and Plan:  Lucia Chakraborty meets criteria for a diagnosis of     Continue the medication regimen as prescribed  Disposition planning to continue. I certify that this patients inpatient psychiatric hospital services furnished since the previous certification were, and continue to be, required for treatment that could reasonably be expected to improve the patient's condition, or for diagnostic study, and that the patient continues to need, on a daily basis, active treatment furnished directly by or requiring the supervision of inpatient psychiatric facility personnel. In addition, the hospital records show that services furnished were intensive treatment services, admission or related services, or equivalent services.

## 2020-07-13 NOTE — BH NOTES
GROUP THERAPY PROGRESS NOTE    Patient did not participate in Discharge Group.      Austin Tracy LPC [facility]

## 2020-07-13 NOTE — BH NOTES
PSYCHIATRIC PROGRESS NOTE         Patient Name  Demarco Vigil   Date of Birth 1995   [facility] [id number]   Medical Record Number  [id number]      Age  31 y.o. PCP Erika Kaufman MD   Admit date:  7/10/2020    Room Number  329/01  @ [facility]   Date of Service  7/13/2020         E & M PROGRESS NOTE:         HISTORY       CC:  \"depression\"  HISTORY OF PRESENT ILLNESS/INTERVAL HISTORY:  (reviewed/updated 7/13/2020). per initial evaluation: Demarco Vigil is a 31year old female admitted to the [facility] for suicidal ideation. Her mood has been \"easily irritated. \" She has no plan for suicide. She has been feeling worse for the past couple of months. She moved to [facility] from [place] in January and has not gotten a good support system here since moving. She has difficulties falling asleep. She is only motivated to get things completed for her job but nothing else. She had been taking Effexor for about 9 months but has been off for at least a few weeks, she also does not feel that it was effective.    07/13 - no acute overnight events. Patient slept 8.25 hours overnight; she is visible this morning, depressed, but able to contract for safety. The patient acknowledges isolation as a contributing factor to her SI, and states that she thought about slitting her wrists but had not previously attempted this. Patient counseled on SSRI black box warning and agrees to increase dose, continue observation. SIDE EFFECTS: (reviewed/updated 7/13/2020)  None reported or admitted to. ALLERGIES:(reviewed/updated 7/13/2020)  Allergies   Allergen Reactions    [place] Nausea Only      MEDICATIONS PRIOR TO ADMISSION:(reviewed/updated 7/13/2020)  Medications Prior to Admission   Medication Sig    venlafaxine-SR (Effexor XR) 37.5 mg capsule Take 37.5 mg by mouth daily.       PAST MEDICAL HISTORY: Past medical history from the initial psychiatric evaluation has been reviewed (reviewed/updated 7/13/2020) with no additional updates (I asked patient and no additional past medical history provided). Past Medical History:   Diagnosis Date    Depression      Past Surgical History:   Procedure Laterality Date    HX ORTHOPAEDIC      Back      SOCIAL HISTORY: Social history from the initial psychiatric evaluation has been reviewed (reviewed/updated 7/13/2020) with no additional updates (I asked patient and no additional social history provided).    Social History     Socioeconomic History    Marital status: SINGLE     Spouse name: Not on file    Number of children: Not on file    Years of education: Not on file    Highest education level: Not on file   Occupational History    Not on file   Social Needs    Financial resource strain: Not on file    Food insecurity     Worry: Not on file     Inability: Not on file    Transportation needs     Medical: Not on file     Non-medical: Not on file   Tobacco Use    Smoking status: Never Smoker    Smokeless tobacco: Never Used   Substance and Sexual Activity    Alcohol use: Yes     Frequency: Never     Comment: Ocassionally    Drug use: Never    Sexual activity: Not on file   Lifestyle    Physical activity     Days per week: Not on file     Minutes per session: Not on file    Stress: Not on file   Relationships    Social connections     Talks on phone: Not on file     Gets together: Not on file     Attends Islam service: Not on file     Active member of club or organization: Not on file     Attends meetings of clubs or organizations: Not on file     Relationship status: Not on file    Intimate partner violence     Fear of current or ex partner: Not on file     Emotionally abused: Not on file     Physically abused: Not on file     Forced sexual activity: Not on file   Other Topics Concern    Not on file   Social History Narrative    Not on file      FAMILY HISTORY: Family history from the initial psychiatric evaluation has been reviewed (reviewed/updated 7/13/2020) with no additional updates (I asked patient and no additional family history provided). History reviewed. No pertinent family history. REVIEW OF SYSTEMS: (reviewed/updated 7/13/2020)  Appetite:no change from normal   Sleep: improved   All other Review of Systems: Negative except per HPI         [facility] (MSE):    MSE FINDINGS ARE WITHIN NORMAL LIMITS (WNL) UNLESS OTHERWISE STATED BELOW. ( ALL OF THE BELOW CATEGORIES OF THE MSE HAVE BEEN REVIEWED (reviewed 7/13/2020) AND UPDATED AS DEEMED APPROPRIATE )  General Presentation age appropriate, cooperative and guarded   Orientation oriented to time, place and person   Vital Signs  See below (reviewed 7/13/2020); Vital Signs (BP, Pulse, & Temp) are within normal limits if not listed below.    Gait and Station Stable/steady, no ataxia   Musculoskeletal System No extrapyramidal symptoms (EPS); no abnormal muscular movements or Tardive Dyskinesia (TD); muscle strength and tone are within normal limits   Language No aphasia or dysarthria   Speech:  non-pressured and soft   Thought Processes logical; slow rate of thoughts; fair abstract reasoning/computation   Thought Associations goal directed   Thought Content preoccupations   Suicidal Ideations contracts for safety   Homicidal Ideations none   Mood:  depressed and anhedonia   Affect:  restricted and mood-congruent   Memory recent  intact   Memory remote:  intact   Concentration/Attention:  intact   Fund of Knowledge average   Insight:  fair   Reliability fair   Judgment:  fair          VITALS:     Patient Vitals for the past 24 hrs:   Temp Pulse Resp BP SpO2   07/12/20 1940 98.3 °F (36.8 °C) 64 18 160/78 100 %     Wt Readings from Last 3 Encounters:   07/10/20 156.5 kg (345 lb)     Temp Readings from Last 3 Encounters:   07/12/20 98.3 °F (36.8 °C)     BP Readings from Last 3 Encounters:   07/12/20 160/78     Pulse Readings from Last 3 Encounters:   07/12/20 64            DATA LABORATORY DATA:(reviewed/updated 7/13/2020)  No results found for this or any previous visit (from the past 24 hour(s)). No results found for: VALF2, VALAC, VALP, VALPR, DS6, CRBAM, CRBAMP, CARB2, XCRBAM  No results found for: LITHM   RADIOLOGY REPORTS:(reviewed/updated 7/13/2020)  No results found. MEDICATIONS     ALL MEDICATIONS:   Current Facility-Administered Medications   Medication Dose Route Frequency    [START ON 7/14/2020] sertraline (ZOLOFT) tablet 50 mg  50 mg Oral DAILY    ondansetron (ZOFRAN ODT) tablet 4 mg  4 mg Oral Q6H PRN    OLANZapine (ZyPREXA) tablet 5 mg  5 mg Oral Q6H PRN    haloperidol lactate (HALDOL) injection 5 mg  5 mg IntraMUSCular Q6H PRN    benztropine (COGENTIN) tablet 1 mg  1 mg Oral BID PRN    diphenhydrAMINE (BENADRYL) injection 50 mg  50 mg IntraMUSCular BID PRN    hydrOXYzine HCL (ATARAX) tablet 50 mg  50 mg Oral TID PRN    LORazepam (ATIVAN) injection 1 mg  1 mg IntraMUSCular Q4H PRN    traZODone (DESYREL) tablet 50 mg  50 mg Oral QHS PRN    acetaminophen (TYLENOL) tablet 650 mg  650 mg Oral Q4H PRN    magnesium hydroxide (MILK OF MAGNESIA) 400 mg/5 mL oral suspension 30 mL  30 mL Oral DAILY PRN      SCHEDULED MEDICATIONS:   Current Facility-Administered Medications   Medication Dose Route Frequency    [START ON 7/14/2020] sertraline (ZOLOFT) tablet 50 mg  50 mg Oral DAILY          ASSESSMENT & PLAN     DIAGNOSES REQUIRING ACTIVE TREATMENT AND MONITORING: (reviewed/updated 7/13/2020)  Patient Active Hospital Problem List:   MDD (major depressive disorder) (7/10/2020)    Assessment: patient recently on Effexor, stopped due to availability and now with worsening SI. Started on alternate agent (SSRI) and will titrate to typical efficacious dose, discharge home with services after observation.     Plan:  - INCREASE Zoloft to 50 mg QDAY for MDD  - Obtain collateral / safety planning with friend in the area  - IGM therapy as tolerated    In summary, Liza Neff Magdy Fernández, is a 31 y.o.  female who presents with a severe exacerbation of the principal diagnosis of MDD (major depressive disorder)    Patient's condition is improving. Patient requires continued inpatient hospitalization for further stabilization, safety monitoring and medication management. I will continue to coordinate the provision of individual, milieu, occupational, group, and substance abuse therapies to address target symptoms/diagnoses as deemed appropriate for the individual patient. A coordinated, multidisplinary treatment team round was conducted with the patient (this team consists of the nurse, psychiatric unit pharmacist,  and writer). Complete current electronic health record for patient has been reviewed today including consultant notes, ancillary staff notes, nurses and psychiatric tech notes. Suicide risk assessment completed and patient deemed to be of moderate risk for suicide at this time. The following regarding medications was addressed during rounds with patient:   the risks and benefits of the proposed medication. The patient was given the opportunity to ask questions. Informed consent given to the use of the above medications. Will continue to adjust psychiatric and non-psychiatric medications (see above \"medication\" section and orders section for details) as deemed appropriate & based upon diagnoses and response to treatment. I will continue to order blood tests/labs and diagnostic tests as deemed appropriate and review results as they become available (see orders for details and above listed lab/test results). I will order psychiatric records from previous [facility] hospitals to further elucidate the nature of patient's psychopathology and review once available.     I will gather additional collateral information from friends, family and o/p treatment team to further elucidate the nature of patient's psychopathology and baselline level of psychiatric functioning. I certify that this patient's inpatient psychiatric hospital services furnished since the previous certification were, and continue to be, required for treatment that could reasonably be expected to improve the patient's condition, or for diagnostic study, and that the patient continues to need, on a daily basis, active treatment furnished directly by or requiring the supervision of inpatient psychiatric facility personnel. In addition, the hospital records show that services furnished were intensive treatment services, admission or related services, or equivalent services.     EXPECTED DISCHARGE DATE/DAY: 7/14/20     DISPOSITION: Home       Signed By:   Dayana Shanks MD  7/13/2020

## 2020-07-13 NOTE — PROGRESS NOTES
Problem: Suicide  Goal: *STG: Remains safe in hospital  Outcome: Progressing Towards Goal     Problem: Suicide  Goal: *STG: Seeks staff when feelings of self harm or harm towards others arise  Outcome: Progressing Towards Goal     Problem: Suicide  Goal: *STG: Attends activities and groups  Outcome: Progressing Towards Goal     Problem: Suicide  Goal: *STG:  Verbalizes alternative ways of dealing with maladaptive feelings/behaviors  Outcome: Progressing Towards Goal     Problem: Suicide  Goal: *STG/LTG: Complies with medication therapy  Outcome: Progressing Towards Goal    [id number]- Report given by Pepe Cowart and I assume care of the patient. Patient is now in the dayroom putting a puzzle together. The patient is calm and cooperative. Patient denies SI/HI/AH/VH. Patient has been sitting in the dayroom watching TV but isolative to self. Patient stated that she is depressed because she is homesick but denies anxiety. Will continue to monitor for safety and behavior. 0630- Patient slept approx. 8.25 hrs.

## 2020-07-13 NOTE — BH NOTES
Behavioral Health Treatment Team Note     Patient goal(s) for today: take medications as prescribed. Treatment team focus/goals: stabilize on medication and discharge plan  Progress note: Denies SI/HI/AH. Pt's mood was depressed, affect is restricted, insight and reliability are fair. LOS:  3  Expected LOS: tbd    Financial concerns/prescription coverage: [place] [facility]  Date of last family contact:  None    Family requesting physician contact today:    Discharge plan: home   Guns in the home: Pt reports no guns        Outpatient provider(s): To be linked     Participating treatment team members: Martir Nation MSW and Dr. Jordan Higginbotham.

## 2020-07-13 NOTE — GROUP NOTE
ANGELI  GROUP DOCUMENTATION INDIVIDUAL Group Therapy Note Date: 7/13/2020 Group Start Time: 1000 Group End Time: 1100 Group Topic: Topic Group [facility] 3 ACUTE BEHAV [facility] [place], [address] GROUP DOCUMENTATION GROUP Group Therapy Note Attendees: 5 Attendance: Attended Patient's Goal:  To identify personal affirmations Interventions/techniques: Supported-worksheet Follows Directions: Followed directions Interactions: Interacted appropriately Mental Status: Calm and Flat Behavior/appearance: Attentive, Cooperative and Needed prompting Goals Achieved: Able to engage in interactions, Able to listen to others, Able to reflect/comment on own behavior, Able to self-disclose and Discussed coping Additional Notes:   
 
[place]

## 2020-07-13 NOTE — GROUP NOTE
ANGELI  GROUP DOCUMENTATION INDIVIDUAL                                                                          Group Therapy Note    Date: 7/13/2020    Group Start Time: 1400  Group End Time: 1500  Group Topic: Recreational/Music Therapy    [facility] [place] [address] [facility] [address] [facility] GROUP DOCUMENTATION GROUP    Group Therapy Note    Attendees:          Attendance: Did not attend    Patient's Goal:      ANTON [place]

## 2020-07-14 VITALS
TEMPERATURE: 98.4 F | BODY MASS INDEX: 39.68 KG/M2 | HEART RATE: 75 BPM | DIASTOLIC BLOOD PRESSURE: 86 MMHG | HEIGHT: 72 IN | WEIGHT: 293 LBS | OXYGEN SATURATION: 100 % | RESPIRATION RATE: 17 BRPM | SYSTOLIC BLOOD PRESSURE: 134 MMHG

## 2020-07-14 PROCEDURE — 74011250637 HC RX REV CODE- 250/637: Performed by: PSYCHIATRY & NEUROLOGY

## 2020-07-14 RX ORDER — SERTRALINE HYDROCHLORIDE 50 MG/1
50 TABLET, FILM COATED ORAL DAILY
Qty: 30 TAB | Refills: 1 | Status: SHIPPED | OUTPATIENT
Start: 2020-07-15

## 2020-07-14 RX ADMIN — SERTRALINE HYDROCHLORIDE 50 MG: 50 TABLET ORAL at 08:48

## 2020-07-14 NOTE — BH NOTES
Met with Kevin Oliver for an individual session at her request. She reports that she is concerned about going home due to limited support and interpersonal contact. She reports friends and family live outside of the [facility] and her boyfriend is also a long distance relationship. She denies any SI, HI, and hallucinations. Discussed ways she could increase support and things she can do on her own with the COVID restrictions. She asked about getting a pet and this was processed. The pros and cons were discussed and patient reports planning to think more about this before she makes a decision. Also discussed that she could volunteer for animal shelters in the area which would help her meet new people as well as give her the animal interaction that she finds beneficial. She will consider her options more before making a decision.     Rox Mccormick LPC [facility]

## 2020-07-14 NOTE — INTERDISCIPLINARY ROUNDS
Behavioral Health Interdisciplinary Rounds     Patient Name: Demarco Vigil  Age: 31 y.o.   Room/Bed:  [facility]/01  Primary Diagnosis: MDD (major depressive disorder)   Admission Status: Voluntary     Readmission within 30 days: no  Power of  in place: no  Patient requires a blocked bed: yes            Reason for blocked bed: covid  Order for blocked bed obtained: na       Sleep hours: 7.5   Morning Labs completed per orders:  na       Participation in Care/Groups:  yes  Medication Compliant?: Yes  PRNS (last 24 hours): None    Restraints (last 24 hours):  no  Substance Abuse:  no    24 hour chart check complete: yes

## 2020-07-14 NOTE — BH NOTES
GROUP THERAPY PROGRESS NOTE    Patient is participating in Process group. Group time: 45 minutes    Personal goal for participation: To try new coping skills    Goal orientation: Personal    Group therapy participation: active    Therapeutic interventions reviewed and discussed: Group participating in a guided imagery activity designed for depression. Group members were asked to try to relax and follow along with the CD. Discussion of how each person felt during and after exercise. Impression of participation: Luke Rico found the exercise to be relaxing listening to the music but she would get lost in the counting and breathing. She found that she would likely enjoy something that kept her mind more focused because it would wonder.     Gita Gtz LPC [facility]

## 2020-07-14 NOTE — BH NOTES
Patient is in dayroom watching TV AND IS CALM WITH NO COMPLIANTS,DENIES SI/HI/AVH.  Endorses some depression  Slept 7.5 hours

## 2020-07-14 NOTE — BH NOTES
GROUP THERAPY PROGRESS NOTE    Patient is participating in Process Group. Group time: 45 minutes    Personal goal for participation: Ask him or herself questions regarding what was accomplished today. Goal orientation: Personal    Group therapy participation: active    Therapeutic interventions reviewed and discussed: Group discussion was focused 7 questions that individuals should ask themselves each day to determine what they accomplished or made progress on each day and the benefits of doing this. Impression of participation: Luzmaria Garcia shared that she finds the exercise to be something that she could use in her daily life. She shared that she would find question 7 to be very helpful. She reports this would change her focus from what she did not do to what she did do and make her day or week a more positive experience.     Vera Speaks LPC [facility]

## 2020-07-14 NOTE — DISCHARGE SUMMARY
PSYCHIATRIC DISCHARGE SUMMARY         IDENTIFICATION:    Patient Name  Anish Awan   Date of Birth 1995   [facility] [id number]   Medical Record Number  [id number]      Age  31 y.o. PCP Son Faye MD   Admit date:  7/10/2020    Discharge date: 7/14/2020   Room Number  329/01  @ [facility]   Date of Service  7/14/2020            TYPE OF DISCHARGE: REGULAR               CONDITION AT DISCHARGE: fair       PROVISIONAL & DISCHARGE DIAGNOSES:    Problem List  Never Reviewed          Codes Class    * (Principal) MDD (major depressive disorder) ICD-10-CM: F32.9  ICD-9-CM: 296.20               Active Hospital Problems    *MDD (major depressive disorder)        DISCHARGE DIAGNOSIS:   Axis I:  SEE ABOVE  Axis II: SEE ABOVE  Axis III: SEE ABOVE  Axis IV:  lack of structure  Axis V:  30 on admission, 60 on discharge     CC & HISTORY OF PRESENT ILLNESS:  \"suicidal ideation\"     Anish Awan is a 31year old female admitted to the [facility] for suicidal ideation. Her mood has been \"easily irritated. \" She has no plan for suicide. She has been feeling worse for the past couple of months. She moved to [place] from [place] in January and has not gotten a good support system here since moving. She has difficulties falling asleep. She is only motivated to get things completed for her job but nothing else. She had been taking Effexor for about 9 months but has been off for at least a few weeks, she also does not feel that it was effective.     07/13 - no acute overnight events. Patient slept 8.25 hours overnight; she is visible this morning, depressed, but able to contract for safety. The patient acknowledges isolation as a contributing factor to her SI, and states that she thought about slitting her wrists but had not previously attempted this. Patient counseled on SSRI black box warning and agrees to increase dose, continue observation.        SOCIAL HISTORY:    Social History     Socioeconomic History    Marital status: SINGLE     Spouse name: Not on file    Number of children: Not on file    Years of education: Not on file    Highest education level: Not on file   Occupational History    Not on file   Social Needs    Financial resource strain: Not on file    Food insecurity     Worry: Not on file     Inability: Not on file    Transportation needs     Medical: Not on file     Non-medical: Not on file   Tobacco Use    Smoking status: Never Smoker    Smokeless tobacco: Never Used   Substance and Sexual Activity    Alcohol use: Yes     Frequency: Never     Comment: Ocassionally    Drug use: Never    Sexual activity: Not on file   Lifestyle    Physical activity     Days per week: Not on file     Minutes per session: Not on file    Stress: Not on file   Relationships    Social connections     Talks on phone: Not on file     Gets together: Not on file     Attends Jehovah's witness service: Not on file     Active member of club or organization: Not on file     Attends meetings of clubs or organizations: Not on file     Relationship status: Not on file    Intimate partner violence     Fear of current or ex partner: Not on file     Emotionally abused: Not on file     Physically abused: Not on file     Forced sexual activity: Not on file   Other Topics Concern    Not on file   Social History Narrative    Not on file      FAMILY HISTORY:   History reviewed. No pertinent family history. HOSPITALIZATION COURSE:    Sandy Saunders was admitted to the inpatient psychiatric unit [facility] for acute psychiatric stabilization in regards to symptomatology as described in the HPI above. The differential diagnosis at time of admission included: MDD vs adjustment disorder. While on the unit Sandy Saunders was involved in individual, group, occupational and milieu therapy. Psychiatric medications were adjusted during this hospitalization including Zoloft.    Sandy Saunders demonstrated a slow, but progressive improvement in overall condition. Much of patient's initial presentation appeared to be related to situational stressors, effects of medication non-compliance and psychological factors. Please see individual progress notes for more specific details regarding patient's hospitalization course. Patient with request for discharge today. There are no grounds to seek a TDO. At time of discharge, Lucia Chakraborty is without significant problems of depression, psychosis, or paola. Patient free of suicidal and homicidal ideations (appears to be at very low risk of suicide or homicide) and reports many positive predictive factors in terms of not attempting suicide or homicide. Overall presentation at time of discharge is most consistent with the diagnosis of major depressive disorder. Patient has maximized benefit to be derived from acute inpatient psychiatric treatment. All members of the treatment team concur with each other in regards to plans for discharge today. Patient and support system are aware and in agreement with discharge and discharge plan.          LABS AND IMAGAING:    Labs Reviewed   CBC WITH AUTOMATED DIFF - Abnormal; Notable for the following components:       Result Value    HGB 10.1 (*)     HCT 33.1 (*)     MCH 24.9 (*)     RDW 14.8 (*)     All other components within normal limits   METABOLIC PANEL, COMPREHENSIVE - Abnormal; Notable for the following components:    Glucose 117 (*)     AST (SGOT) 13 (*)     A-G Ratio 0.9 (*)     All other components within normal limits   SALICYLATE - Abnormal; Notable for the following components:    Salicylate level <2.7 (*)     All other components within normal limits   ACETAMINOPHEN - Abnormal; Notable for the following components:    Acetaminophen level <2 (*)     All other components within normal limits   URINALYSIS W/ RFLX MICROSCOPIC - Abnormal; Notable for the following components:    Specific gravity >1.030 (*)     All other components within normal limits   ETHYL ALCOHOL   DRUG SCREEN, URINE   HCG URINE, QL. - POC     No results found for: DS35, PHEN, PHENO, PHENT, DILF, DS39, PHENY, PTN, VALF2, VALAC, VALP, VALPR, DS6, CRBAM, CRBAMP, CARB2, XCRBAM  Admission on 07/10/2020   Component Date Value Ref Range Status    WBC 07/10/2020 7.5  3.6 - 11.0 K/uL Final    RBC 07/10/2020 4.05  3.80 - 5.20 M/uL Final    HGB 07/10/2020 10.1* 11.5 - 16.0 g/dL Final    HCT 07/10/2020 33.1* 35.0 - 47.0 % Final    MCV 07/10/2020 81.7  80.0 - 99.0 FL Final    MCH 07/10/2020 24.9* 26.0 - 34.0 PG Final    MCHC 07/10/2020 30.5  30.0 - 36.5 g/dL Final    RDW 07/10/2020 14.8* 11.5 - 14.5 % Final    PLATELET 50/85/4573 506  150 - 400 K/uL Final    MPV 07/10/2020 11.1  8.9 - 12.9 FL Final    NRBC 07/10/2020 0.0  0  WBC Final    ABSOLUTE NRBC 07/10/2020 0.00  0.00 - 0.01 K/uL Final    NEUTROPHILS 07/10/2020 50  32 - 75 % Final    LYMPHOCYTES 07/10/2020 37  12 - 49 % Final    MONOCYTES 07/10/2020 10  5 - 13 % Final    EOSINOPHILS 07/10/2020 2  0 - 7 % Final    BASOPHILS 07/10/2020 1  0 - 1 % Final    IMMATURE GRANULOCYTES 07/10/2020 0  0.0 - 0.5 % Final    ABS. NEUTROPHILS 07/10/2020 3.7  1.8 - 8.0 K/UL Final    ABS. LYMPHOCYTES 07/10/2020 2.8  0.8 - 3.5 K/UL Final    ABS. MONOCYTES 07/10/2020 0.7  0.0 - 1.0 K/UL Final    ABS. EOSINOPHILS 07/10/2020 0.2  0.0 - 0.4 K/UL Final    ABS. BASOPHILS 07/10/2020 0.1  0.0 - 0.1 K/UL Final    ABS. IMM.  GRANS. 07/10/2020 0.0  0.00 - 0.04 K/UL Final    DF 07/10/2020 AUTOMATED    Final    Sodium 07/10/2020 143  136 - 145 mmol/L Final    Potassium 07/10/2020 3.6  3.5 - 5.1 mmol/L Final    Chloride 07/10/2020 108  97 - 108 mmol/L Final    CO2 07/10/2020 25  21 - 32 mmol/L Final    Anion gap 07/10/2020 10  5 - 15 mmol/L Final    Glucose 07/10/2020 117* 65 - 100 mg/dL Final    BUN 07/10/2020 12  6 - 20 MG/DL Final    Creatinine 07/10/2020 0.89  0.55 - 1.02 MG/DL Final    BUN/Creatinine ratio 07/10/2020 13  12 - 20   Final    GFR est AA 07/10/2020 >60  >60 ml/min/1.73m2 Final    GFR est non-AA 07/10/2020 >60  >60 ml/min/1.73m2 Final    Calcium 07/10/2020 8.7  8.5 - 10.1 MG/DL Final    Bilirubin, total 07/10/2020 0.3  0.2 - 1.0 MG/DL Final    ALT (SGPT) 07/10/2020 24  12 - 78 U/L Final    AST (SGOT) 07/10/2020 13* 15 - 37 U/L Final    Alk. phosphatase 07/10/2020 52  45 - 117 U/L Final    Protein, total 07/10/2020 7.6  6.4 - 8.2 g/dL Final    Albumin 07/10/2020 3.7  3.5 - 5.0 g/dL Final    Globulin 07/10/2020 3.9  2.0 - 4.0 g/dL Final    A-G Ratio 07/10/2020 0.9* 1.1 - 2.2   Final    Salicylate level 37/79/8933 <1.7* 2.8 - 20.0 MG/DL Final    Acetaminophen level 07/10/2020 <2* 10 - 30 ug/mL Final    Color 07/10/2020 YELLOW/STRAW    Final    Appearance 07/10/2020 CLEAR  CLEAR   Final    Specific gravity 07/10/2020 >1.030* 1.003 - 1.030 Final    pH (UA) 07/10/2020 6.0  5.0 - 8.0   Final    Protein 07/10/2020 Negative  NEG mg/dL Final    Glucose 07/10/2020 Negative  NEG mg/dL Final    Ketone 07/10/2020 Negative  NEG mg/dL Final    Bilirubin 07/10/2020 Negative  NEG   Final    Blood 07/10/2020 Negative  NEG   Final    Urobilinogen 07/10/2020 0.2  0.2 - 1.0 EU/dL Final    Nitrites 07/10/2020 Negative  NEG   Final    Leukocyte Esterase 07/10/2020 Negative  NEG   Final    ALCOHOL(ETHYL),SERUM 07/10/2020 <10  <10 MG/DL Final    AMPHETAMINES 07/10/2020 Negative  NEG   Final    BARBITURATES 07/10/2020 Negative  NEG   Final    BENZODIAZEPINES 07/10/2020 Negative  NEG   Final    COCAINE 07/10/2020 Negative  NEG   Final    METHADONE 07/10/2020 Negative  NEG   Final    OPIATES 07/10/2020 Negative  NEG   Final    PCP(PHENCYCLIDINE) 07/10/2020 Negative  NEG   Final    THC (TH-CANNABINOL) 07/10/2020 Negative  NEG   Final    Drug screen comment 07/10/2020 (NOTE)   Final    Pregnancy test,urine (POC) 07/10/2020 Negative  NEG   Final     No results found. DISPOSITION:    Home.  Patient to f/u with psychiatric and psychotherapy appointments. Patient is to f/u with internist as directed. FOLLOW-UP CARE:    Activity as tolerated  Regular diet  Wound Care: none needed. Follow-up Information     Follow up With Specialties Details Why Contact Info    Aisha Faye MD Nurse Practitioner   [address]  [phone]                   PROGNOSIS:   José Henriquez ---- based on nature of patient's pathology/ies and treatment compliance issues. Prognosis is greatly dependent upon patient's ability to remain sober and to follow up with scheduled appointments as well as to comply with psychiatric medications as prescribed. DISCHARGE MEDICATIONS:     Informed consent given for the use of following psychotropic medications:  Current Discharge Medication List      START taking these medications    Details   sertraline (ZOLOFT) 50 mg tablet Take 1 Tab by mouth daily. Indications: anxiousness associated with depression  Qty: 30 Tab, Refills: 1         STOP taking these medications       venlafaxine-SR (Effexor XR) 37.5 mg capsule Comments:   Reason for Stopping:                      A coordinated, multidisplinary treatment team round was conducted with Cruz Velásquez is done daily here at [facility]. This team consists of the nurse, psychiatric unit pharmacist,  and Norris Wesley. I have spent greater than 35 minutes on discharge work.     Signed:  Martir Anthony MD  7/14/2020

## 2020-07-14 NOTE — GROUP NOTE
ANGELI  GROUP DOCUMENTATION INDIVIDUAL                                                                          Group Therapy Note    Date: 7/14/2020    Group Start Time: 1400  Group End Time: 1500  Group Topic: Recreational/Music Therapy    [address] [facility] 3 ACUTE BEHAV [facility]    [place], [address] [facility] GROUP DOCUMENTATION GROUP    Group Therapy Note    Attendees: 5         Attendance: Attended    Patient's Goal:  To concentrate on selected task    Interventions/techniques: Supported-crafts,games,music    Follows Directions: Followed directions    Interactions: Interacted appropriately    Mental Status: Calm    Behavior/appearance: Attentive and Cooperative    Goals Achieved: Able to engage in interactions and Able to listen to others      Additional Notes:  Pt completed selected task.     Gerardo Daniel

## 2020-07-14 NOTE — BH NOTES
0700  Received report from VICKI Cavanaugh RN. Assumed care of the patient. Pt watching television in the day room upon assessment. Pt pleasant and cooperative, pt endorses \"a little depressed because I'm ready to go home. \"  Pt denies anxiety/SI/HI/AVH/pain. Last bm was this morning.       0800  Pt in day room for breakfast    0848  Pt compliant with am medication    [id number]  Pt in day room for group therapy    1200  Pt in day room for lunch    1330  Pt compliant with MRSA swab    1415  Pt in day room for group therapy

## 2020-07-14 NOTE — GROUP NOTE
ANGELI  GROUP DOCUMENTATION INDIVIDUAL                                                                          Group Therapy Note    Date: 7/14/2020    Group Start Time: 1000  Group End Time: 1100  Group Topic: Topic Group    [facility] 3 ACUTE BEHAV [facility]    [place], [address] [facility] GROUP DOCUMENTATION GROUP    Group Therapy Note    Attendees: 6         Attendance: Attended    Patient's Goal:  To identify positive and negative coping strategies    Interventions/techniques: Supported-handouts    Follows Directions:  Followed directions    Interactions: Interacted appropriately    Mental Status: Calm    Behavior/appearance: Attentive and Cooperative    Goals Achieved: Able to engage in interactions, Able to listen to others and Discussed coping      Additional Notes:      Juan Alberto Lundberg

## 2020-07-14 NOTE — DISCHARGE INSTRUCTIONS
Patient Education        Recovering From Depression: Care Instructions  Your Care Instructions     Taking good care of yourself is important as you recover from depression. In time, your symptoms will fade as your treatment takes hold. Do not give up. Instead, focus your energy on getting better. Your mood will improve. It just takes some time. Focus on things that can help you feel better, such as being with friends and family, eating well, and getting enough rest. But take things slowly. Do not do too much too soon. You will begin to feel better gradually. Follow-up care is a key part of your treatment and safety. Be sure to make and go to all appointments, and call your doctor if you are having problems. It's also a good idea to know your test results and keep a list of the medicines you take. How can you care for yourself at home? Be realistic  · If you have a large task to do, break it up into smaller steps you can handle, and just do what you can. · You may want to put off important decisions until your depression has lifted. If you have plans that will have a major impact on your life, such as marriage, divorce, or a job change, try to wait a bit. Talk it over with friends and loved ones who can help you look at the overall picture first.  · Reaching out to people for help is important. Do not isolate yourself. Let your family and friends help you. Find someone you can trust and confide in, and talk to that person. · Be patient, and be kind to yourself. Remember that depression is not your fault and is not something you can overcome with willpower alone. Treatment is necessary for depression, just like for any other illness. Feeling better takes time, and your mood will improve little by little. Stay active  · Stay busy and get outside. Take a walk, or try some other light exercise. · Talk with your doctor about an exercise program. Exercise can help with mild depression. · Go to a movie or concert. Take part in a [employer] activity or other social gathering. Go to a [employer] game. · Ask a friend to have dinner with you. Take care of yourself  · Eat a balanced diet with plenty of fresh fruits and vegetables, whole grains, and lean protein. If you have lost your appetite, eat small snacks rather than large meals. · Avoid drinking alcohol or using illegal drugs. Do not take medicines that have not been prescribed for you. They may interfere with medicines you may be taking for depression, or they may make your depression worse. · Take your medicines exactly as they are prescribed. You may start to feel better within 1 to 3 weeks of taking antidepressant medicine. But it can take as many as 6 to 8 weeks to see more improvement. If you have questions or concerns about your medicines, or if you do not notice any improvement by 3 weeks, talk to your doctor. · If you have any side effects from your medicine, tell your doctor. Antidepressants can make you feel tired, dizzy, or nervous. Some people have dry mouth, constipation, headaches, sexual problems, or diarrhea. Many of these side effects are mild and will go away on their own after you have been taking the medicine for a few weeks. Some may last longer. Talk to your doctor if side effects are bothering you too much. You might be able to try a different medicine. · Get enough sleep. If you have problems sleeping:  ? Go to bed at the same time every night, and get up at the same time every morning. ? Keep your bedroom dark and quiet. ? Do not exercise after 5:00 p.m.  ? Avoid drinks with caffeine after 5:00 p.m. · Avoid sleeping pills unless they are prescribed by the doctor treating your depression. Sleeping pills may make you groggy during the day, and they may interact with other medicine you are taking. · If you have any other illnesses, such as diabetes, heart disease, or high blood pressure, make sure to continue with your treatment.  Tell your doctor about all of the medicines you take, including those with or without a prescription. · Keep the numbers for these national suicide hotlines: 2-431-148-TALK (4-629.958.8346) and 1-355-LRTVZFS (1-414.903.8606). If you or someone you know talks about suicide or feeling hopeless, get help right away. When should you call for help? [id number] anytime you think you may need emergency care. For example, call if:  · You feel like hurting yourself or someone else. · Someone you know has depression and is about to attempt or is attempting suicide. Call your doctor now or seek immediate medical care if:  · You hear voices. · Someone you know has depression and:  ? Starts to give away his or her possessions. ? Uses illegal drugs or drinks alcohol heavily. ? Talks or writes about death, including writing suicide notes or talking about guns, knives, or pills. ? Starts to spend a lot of time alone. ? Acts very aggressively or suddenly appears calm. Watch closely for changes in your health, and be sure to contact your doctor if:  · You do not get better as expected. Where can you learn more? Go to http://www.gray.com/  Enter N529 in the search box to learn more about \"Recovering From Depression: Care Instructions. \"  Current as of: January 31, 2020               Content Version: 12.5  © 8980-8793 [employer]. Care instructions adapted under license by [employer] (which disclaims liability or warranty for this information). If you have questions about a medical condition or this instruction, always ask your healthcare professional. [place] [address] any warranty or liability for your use of this information. Zahira De .If I feel I am at risk of hurting myself or others, I will call the crisis office and speak with a crisis worker who will assist me during my crisis. [facility] -  [phone]  [facility]- [phone]  [place] [facility]- [phone]  [facility]   [address] [facility] [address] [facility]  [address] [facility]  [phone]

## 2021-06-14 NOTE — BH NOTES
Behavioral Health Transition Record to Provider    Patient Name: Idris Lucero  YOB: 1995  Medical Record Number: [id number]  Date of Admission: 7/10/2020  Date of Discharge: 7/14/2020    Attending Provider: Marcin Quiros MD  Discharging Provider: Marcin Quiros MD  To contact this individual call [phone] and ask the  to page. If unavailable, ask to be transferred to [facility] Provider on call. [facility] Provider will be available on call 24/7 and during holidays. Primary Care Provider: Judah Smith MD    Allergies   Allergen Reactions    [facility] Nausea Only       Reason for Admission: depression     Admission Diagnosis: MDD (major depressive disorder) [F32.9]    * No surgery found *    Results for orders placed or performed during the hospital encounter of 07/10/20   CBC WITH AUTOMATED DIFF   Result Value Ref Range    WBC 7.5 3.6 - 11.0 K/uL    RBC 4.05 3.80 - 5.20 M/uL    HGB 10.1 (L) 11.5 - 16.0 g/dL    HCT 33.1 (L) 35.0 - 47.0 %    MCV 81.7 80.0 - 99.0 FL    MCH 24.9 (L) 26.0 - 34.0 PG    MCHC 30.5 30.0 - 36.5 g/dL    RDW 14.8 (H) 11.5 - 14.5 %    PLATELET 213 215 - 022 K/uL    MPV 11.1 8.9 - 12.9 FL    NRBC 0.0 0  WBC    ABSOLUTE NRBC 0.00 0.00 - 0.01 K/uL    NEUTROPHILS 50 32 - 75 %    LYMPHOCYTES 37 12 - 49 %    MONOCYTES 10 5 - 13 %    EOSINOPHILS 2 0 - 7 %    BASOPHILS 1 0 - 1 %    IMMATURE GRANULOCYTES 0 0.0 - 0.5 %    ABS. NEUTROPHILS 3.7 1.8 - 8.0 K/UL    ABS. LYMPHOCYTES 2.8 0.8 - 3.5 K/UL    ABS. MONOCYTES 0.7 0.0 - 1.0 K/UL    ABS. EOSINOPHILS 0.2 0.0 - 0.4 K/UL    ABS. BASOPHILS 0.1 0.0 - 0.1 K/UL    ABS. IMM.  GRANS. 0.0 0.00 - 0.04 K/UL    DF AUTOMATED     METABOLIC PANEL, COMPREHENSIVE   Result Value Ref Range    Sodium 143 136 - 145 mmol/L    Potassium 3.6 3.5 - 5.1 mmol/L    Chloride 108 97 - 108 mmol/L    CO2 25 21 - 32 mmol/L    Anion gap 10 5 - 15 mmol/L    Glucose 117 (H) 65 - 100 mg/dL    BUN 12 6 - 20 MG/DL    Creatinine 0.89 Physical Therapy  Patient not seen in therapy.     Attempted PT treatment however pt refusing PT treatment at this time, stating he wants to try to eat some solid food. Pt extensively educated on benefits of PT services during LOS but continued to decline treatment.   Pt expressed his desire to perform mobility.     PT will f/u as schedule permits.         OBJECTIVE                     Therapy procedure time and total treatment time can be found documented on the Time Entry flowsheet   0.55 - 1.02 MG/DL    BUN/Creatinine ratio 13 12 - 20      GFR est AA >60 >60 ml/min/1.73m2    GFR est non-AA >60 >60 ml/min/1.73m2    Calcium 8.7 8.5 - 10.1 MG/DL    Bilirubin, total 0.3 0.2 - 1.0 MG/DL    ALT (SGPT) 24 12 - 78 U/L    AST (SGOT) 13 (L) 15 - 37 U/L    Alk. phosphatase 52 45 - 117 U/L    Protein, total 7.6 6.4 - 8.2 g/dL    Albumin 3.7 3.5 - 5.0 g/dL    Globulin 3.9 2.0 - 4.0 g/dL    A-G Ratio 0.9 (L) 1.1 - 2.2     SALICYLATE   Result Value Ref Range    Salicylate level <5.7 (L) 2.8 - 20.0 MG/DL   ACETAMINOPHEN   Result Value Ref Range    Acetaminophen level <2 (L) 10 - 30 ug/mL   URINALYSIS W/ RFLX MICROSCOPIC   Result Value Ref Range    Color YELLOW/STRAW      Appearance CLEAR CLEAR      Specific gravity >1.030 (H) 1.003 - 1.030    pH (UA) 6.0 5.0 - 8.0      Protein Negative NEG mg/dL    Glucose Negative NEG mg/dL    Ketone Negative NEG mg/dL    Bilirubin Negative NEG      Blood Negative NEG      Urobilinogen 0.2 0.2 - 1.0 EU/dL    Nitrites Negative NEG      Leukocyte Esterase Negative NEG     ETHYL ALCOHOL   Result Value Ref Range    ALCOHOL(ETHYL),SERUM <10 <10 MG/DL   DRUG SCREEN, URINE   Result Value Ref Range    AMPHETAMINES Negative NEG      BARBITURATES Negative NEG      BENZODIAZEPINES Negative NEG      COCAINE Negative NEG      METHADONE Negative NEG      OPIATES Negative NEG      PCP(PHENCYCLIDINE) Negative NEG      THC (TH-CANNABINOL) Negative NEG      Drug screen comment (NOTE)    HCG URINE, QL. - POC   Result Value Ref Range    Pregnancy test,urine (POC) Negative NEG         Immunizations administered during this encounter: There is no immunization history on file for this patient. Screening for Metabolic Disorders for Patients on Antipsychotic Medications  (Data obtained from the EMR)    Estimated Body Mass Index  Estimated body mass index is 46.79 kg/m² as calculated from the following:    Height as of this encounter: 6' (1.829 m).     Weight as of this encounter: 156.5 kg (345 lb).     Vital Signs/Blood Pressure  Visit Vitals  /86 (BP 1 Location: Left arm, BP Patient Position: Sitting)   Pulse 75   Temp 98.4 °F (36.9 °C)   Resp 17   Ht 6' (1.829 m)   Wt 156.5 kg (345 lb)   LMP 2020   SpO2 100%   BMI 46.79 kg/m²       Blood Glucose/Hemoglobin A1c  Lab Results   Component Value Date/Time    Glucose 117 (H) 07/10/2020 08:14 PM       No results found for: HBA1C, HGBE8, [id number]     Lipid Panel  No results found for: CHOL, CHOLX, CHLST, CHOLV, 838845, HDL, HDLP, LDL, LDLC, DLDLP, TGLX, TRIGL, TRIGP, CHHD, CHHDX     Discharge Diagnosis: Please refer to physician's discharge summary. Discharge Plan:DISCHARGE SUMMARY    Margo Levin  : 1995  MRN: [id number]    The patient Dafne Maguire exhibits the ability to control behavior in a less restrictive environment. Patient's level of functioning is improving. No assaultive/destructive behavior has been observed for the past 24 hours. No suicidal/homicidal threat or behavior has been observed for the past 24 hours. There is no evidence of serious medication side effects. Patient has not been in physical or protective restraints for at least the past 24 hours. No weapons involved. Boyfriend and track mate part of support group that will be contacting pt. Pt provided with additional social resources due to continued isolation during [address] pandemic            Discharge Medication List and Instructions:   Discharge Medication List as of 2020  5:01 PM      START taking these medications    Details   sertraline (ZOLOFT) 50 mg tablet Take 1 Tab by mouth daily.  Indications: anxiousness associated with depression, Normal, Disp-30 Tab,R-1         STOP taking these medications       venlafaxine-SR (Effexor XR) 37.5 mg capsule Comments:   Reason for Stopping:               Unresulted Labs (24h ago, onward)    None        To obtain results of studies pending at discharge, please contact N/A    Follow-up Information Follow up With Specialties Details Why Darshana Yanez    will call you on Wednesday, July 15th with therapy and medication management appointment dates and times. Address: [address] [place] [address] Leatha E Joanna Rodriguez, Laura Rodriguez  Phone: [phone]  Fax:  [phone]          Advanced Directive:   Does the patient have an appointed surrogate decision maker? Unknown   Does the patient have a Medical Advance Directive? Unknown   Does the patient have a Psychiatric Advance Directive? Unknown   If the patient does not have a surrogate or Medical Advance Directive AND Psychiatric Advance Directive, the patient was offered information on these advance directives. Unknown     Patient Instructions: Please continue all medications until otherwise directed by physician. Tobacco Cessation Discharge Plan:   Is the patient a smoker and needs referral for smoking cessation? No  Patient referred to the following for smoking cessation with an appointment? No   Patient was offered medication to assist with smoking cessation at discharge? No  Was education for smoking cessation added to the discharge instructions? No     Alcohol/Substance Abuse Discharge Plan:   Does the patient have a history of substance/alcohol abuse and requires a referral for treatment? No  Patient referred to the following for substance/alcohol abuse treatment with an appointment? No  Patient was offered medication to assist with alcohol cessation at discharge? No  Was education for substance/alcohol abuse added to discharge instructions? No     Patient discharged to Home; provided to the patient/caregiver either in hard copy or electronically. Continuing care paperwork was faxed to community mental health providers.